# Patient Record
Sex: MALE | Race: BLACK OR AFRICAN AMERICAN | NOT HISPANIC OR LATINO | Employment: OTHER | URBAN - NONMETROPOLITAN AREA
[De-identification: names, ages, dates, MRNs, and addresses within clinical notes are randomized per-mention and may not be internally consistent; named-entity substitution may affect disease eponyms.]

---

## 2023-07-06 PROBLEM — M79.672 LEFT FOOT PAIN: Status: ACTIVE | Noted: 2023-07-06

## 2023-07-11 ENCOUNTER — APPOINTMENT (OUTPATIENT)
Dept: PHYSICAL THERAPY | Facility: HOSPITAL | Age: 73
End: 2023-07-11
Payer: MEDICARE

## 2023-07-20 ENCOUNTER — APPOINTMENT (OUTPATIENT)
Dept: PHYSICAL THERAPY | Facility: HOSPITAL | Age: 73
End: 2023-07-20
Payer: MEDICARE

## 2023-07-25 ENCOUNTER — APPOINTMENT (OUTPATIENT)
Dept: PHYSICAL THERAPY | Facility: HOSPITAL | Age: 73
End: 2023-07-25
Payer: MEDICARE

## 2023-07-27 ENCOUNTER — HOSPITAL ENCOUNTER (OUTPATIENT)
Dept: PHYSICAL THERAPY | Facility: HOSPITAL | Age: 73
Setting detail: THERAPIES SERIES
Discharge: HOME | End: 2023-07-27
Payer: MEDICARE

## 2023-07-27 DIAGNOSIS — M25.512 LEFT SHOULDER PAIN, UNSPECIFIED CHRONICITY: Primary | ICD-10-CM

## 2023-07-27 PROCEDURE — 97110 THERAPEUTIC EXERCISES: CPT

## 2023-07-27 NOTE — THERAPY DISCHARGE NOTE
Outpatient Physical Therapy Ortho Treatment Note/Discharge Summary  [facility]     Patient Name: Paramjit Hector  : 1950  MRN: [id number]  Today's Date: 2023      Visit Date: 2023    ATTENDANCE:  ([facility])  SUBJECTIVE IMPROVEMENT: 95-98%  NEXT MD APPOINTMENT:  unknown for follow up with Ortho in \[facility]\"" for shoulder   RECHECK: 23     THERAPY DIAGNOSIS: L shoulder pain- impingement/ RTC tendonitis     Visit Dx:    ICD-10-CM ICD-9-CM   1. Left shoulder pain, unspecified chronicity  M25.512 719.41       Patient Active Problem List   Diagnosis    Left foot pain        Past Medical History:   Diagnosis Date    Hypertension     TIA (transient ischemic attack)     Vitamin D deficiency         No past surgical history on file.     PT Ortho       Row Name 23 1500       Precautions and Contraindications    Precautions/Limitations no known precautions/limitations  -       Posture/Observations    Posture/Observations Comments no acute distress  -              User Key  (r) = Recorded By, (t) = Taken By, (c) = Cosigned By      Initials Name Provider Type    Cathy Kelsey PTA Physical Therapist Assistant                                 PT Assessment/Plan       Row Name 23 1500          PT Assessment    Functional Limitations Limitation in home management;Limitations in functional capacity and performance;Performance in leisure activities  -     Impairments Impaired flexibility;Muscle strength;Pain;Posture;Range of motion  -     Assessment Comments met all goals at this time; finalized HEP and given free month membership to [facility] for further strengthening;  -     Rehab Potential Good  -     Patient/caregiver participated in establishment of treatment plan and goals Yes  -     Patient would benefit from skilled therapy intervention Yes  -        PT Plan    Predicted Duration of Therapy Intervention (PT) Discharge  -     PT Plan Comments Discharge to independent  "program and HEP at this time;  -KH               User Key  (r) = Recorded By, (t) = Taken By, (c) = Cosigned By      Initials Name Provider Type    Cathy Kelsey PTA Physical Therapist Assistant                         OP Exercises       Row Name 07/27/23 1500             Subjective Comments    Subjective Comments Only has pain when he lifts his arm; Reports that he is 95-98% better  -KH         Subjective Pain    Able to rate subjective pain? yes  -KH      Pre-Treatment Pain Level 0  -KH      Post-Treatment Pain Level 0  -KH         Exercise 1    Exercise Name 1 pro ll UE strength  -KH      Time 1 10 mins  -KH      Additional Comments level 4; seat 10  -KH         Exercise 2    Exercise Name 2 Body Blade at side L UE  -KH      Cueing 2 Verbal;Demo  -KH      Sets 2 5  -KH      Time 2 30\" holds  -KH         Exercise 3    Exercise Name 3 Body Blade 2 UEs in front of body  -KH      Cueing 3 Verbal  -KH      Sets 3 5  -KH      Time 3 30\" holds  -KH         Exercise 4    Exercise Name 4 T-band CLocks  -KH      Cueing 4 Verbal;Demo  -KH      Sets 4 2  -KH      Reps 4 10 ea  -KH      Additional Comments Red T-Band  -KH         Exercise 5    Exercise Name 5 Cybex Chest Press  -KH      Cueing 5 Verbal  -KH      Sets 5 2  -KH      Reps 5 10  -KH      Additional Comments 20#  -KH         Exercise 6    Exercise Name 6 Cybex Row  -KH      Cueing 6 Verbal;Demo  -KH      Sets 6 2  -KH      Reps 6 10  -KH      Additional Comments 50#  -KH         Exercise 7    Exercise Name 7 Cybex Incline Pull  -KH      Cueing 7 Verbal  -KH      Sets 7 2  -KH      Reps 7 10  -KH      Additional Comments 50#  -KH                User Key  (r) = Recorded By, (t) = Taken By, (c) = Cosigned By      Initials Name Provider Type    Cathy Kelsey PTA Physical Therapist Assistant                                    PT OP Goals       Row Name 07/27/23 1500          PT Short Term Goals    STG Date to Achieve 07/27/23  -KH     STG 1 Pt is indpt with HEP.  " -     STG 1 Progress Met  -     STG 2 Pt demos full AROM flex/ext.  -     STG 2 Progress Met  -     STG 3 Pt demo's AROM L shoulder IR to 50 deg or better.  -     STG 3 Progress Met  -        Long Term Goals    LTG Date to Achieve 08/10/23  -     LTG 1 L shoulder MMT flex/abduction to 4/5 or better.  -     LTG 1 Progress Met  -     LTG 2 L shoulder MMT ext, adductoin, and IR/ER 4+/5 or better.  -     LTG 2 Progress Met  -     LTG 3 Pt is able to complete shoulder press with 5# DB with no increase in pain.  -     LTG 3 Progress Met  -     LTG 4 Pt reports subjective improvment 75% or better.  -     LTG 4 Progress Met  -        Time Calculation    PT Goal Re-Cert Due Date 08/11/23  -               User Key  (r) = Recorded By, (t) = Taken By, (c) = Cosigned By      Initials Name Provider Type    Cathy Kelsey PTA Physical Therapist Assistant                    Therapy Education  Education Details: T-Band CLocks  Given: HEP  Program: New  How Provided: Verbal, Demonstration, Written  Provided to: Patient  Level of Understanding: Teach back education performed, Verbalized, Demonstrated              Time Calculation:   Start Time: 1518  Stop Time: 1558  Time Calculation (min): 40 min  Therapy Charges for Today       Code Description Service Date Service Provider Modifiers Qty    [id number] HC PT THER PROC EA 15 MIN 7/27/2023 Cathy Gutiérrez PTA GP, CQ 3                  OP PT Discharge Summary  Date of Discharge: 07/27/23  Reason for Discharge: All goals achieved, Independent  Outcomes Achieved: Able to achieve all goals within established timeline  Discharge Destination: Home with home program      Cathy Gutiérrez PTA  7/27/2023